# Patient Record
Sex: MALE | Race: WHITE | NOT HISPANIC OR LATINO | URBAN - METROPOLITAN AREA
[De-identification: names, ages, dates, MRNs, and addresses within clinical notes are randomized per-mention and may not be internally consistent; named-entity substitution may affect disease eponyms.]

---

## 2020-02-05 ENCOUNTER — OUTPATIENT (OUTPATIENT)
Dept: OUTPATIENT SERVICES | Facility: HOSPITAL | Age: 60
LOS: 1 days | End: 2020-02-05
Payer: MEDICAID

## 2020-02-05 DIAGNOSIS — I10 ESSENTIAL (PRIMARY) HYPERTENSION: ICD-10-CM

## 2020-02-05 DIAGNOSIS — Z95.1 PRESENCE OF AORTOCORONARY BYPASS GRAFT: ICD-10-CM

## 2020-02-05 PROCEDURE — 93306 TTE W/DOPPLER COMPLETE: CPT

## 2020-02-05 PROCEDURE — 93306 TTE W/DOPPLER COMPLETE: CPT | Mod: 26

## 2024-04-17 ENCOUNTER — OUTPATIENT (OUTPATIENT)
Dept: OUTPATIENT SERVICES | Facility: HOSPITAL | Age: 64
LOS: 1 days | End: 2024-04-17
Payer: COMMERCIAL

## 2024-04-17 VITALS
HEART RATE: 53 BPM | RESPIRATION RATE: 20 BRPM | OXYGEN SATURATION: 100 % | SYSTOLIC BLOOD PRESSURE: 117 MMHG | DIASTOLIC BLOOD PRESSURE: 74 MMHG | TEMPERATURE: 98 F

## 2024-04-17 VITALS
DIASTOLIC BLOOD PRESSURE: 75 MMHG | OXYGEN SATURATION: 96 % | HEART RATE: 55 BPM | SYSTOLIC BLOOD PRESSURE: 115 MMHG | RESPIRATION RATE: 16 BRPM

## 2024-04-17 DIAGNOSIS — Z95.1 PRESENCE OF AORTOCORONARY BYPASS GRAFT: Chronic | ICD-10-CM

## 2024-04-17 DIAGNOSIS — Z89.512 ACQUIRED ABSENCE OF LEFT LEG BELOW KNEE: Chronic | ICD-10-CM

## 2024-04-17 DIAGNOSIS — I34.0 NONRHEUMATIC MITRAL (VALVE) INSUFFICIENCY: ICD-10-CM

## 2024-04-17 LAB
ANION GAP SERPL CALC-SCNC: 12 MMOL/L — SIGNIFICANT CHANGE UP (ref 5–17)
BUN SERPL-MCNC: 7.2 MG/DL — LOW (ref 8–20)
CALCIUM SERPL-MCNC: 8.7 MG/DL — SIGNIFICANT CHANGE UP (ref 8.4–10.5)
CHLORIDE SERPL-SCNC: 95 MMOL/L — LOW (ref 96–108)
CO2 SERPL-SCNC: 24 MMOL/L — SIGNIFICANT CHANGE UP (ref 22–29)
CREAT SERPL-MCNC: 0.54 MG/DL — SIGNIFICANT CHANGE UP (ref 0.5–1.3)
EGFR: 111 ML/MIN/1.73M2 — SIGNIFICANT CHANGE UP
GLUCOSE SERPL-MCNC: 125 MG/DL — HIGH (ref 70–99)
HCT VFR BLD CALC: 36.3 % — LOW (ref 39–50)
HGB BLD-MCNC: 13.3 G/DL — SIGNIFICANT CHANGE UP (ref 13–17)
MAGNESIUM SERPL-MCNC: 2 MG/DL — SIGNIFICANT CHANGE UP (ref 1.6–2.6)
MCHC RBC-ENTMCNC: 36.3 PG — HIGH (ref 27–34)
MCHC RBC-ENTMCNC: 36.6 GM/DL — HIGH (ref 32–36)
MCV RBC AUTO: 99.2 FL — SIGNIFICANT CHANGE UP (ref 80–100)
PLATELET # BLD AUTO: 246 K/UL — SIGNIFICANT CHANGE UP (ref 150–400)
POTASSIUM SERPL-MCNC: 4.6 MMOL/L — SIGNIFICANT CHANGE UP (ref 3.5–5.3)
POTASSIUM SERPL-SCNC: 4.6 MMOL/L — SIGNIFICANT CHANGE UP (ref 3.5–5.3)
RBC # BLD: 3.66 M/UL — LOW (ref 4.2–5.8)
RBC # FLD: 11.8 % — SIGNIFICANT CHANGE UP (ref 10.3–14.5)
SODIUM SERPL-SCNC: 131 MMOL/L — LOW (ref 135–145)
WBC # BLD: 10.01 K/UL — SIGNIFICANT CHANGE UP (ref 3.8–10.5)
WBC # FLD AUTO: 10.01 K/UL — SIGNIFICANT CHANGE UP (ref 3.8–10.5)

## 2024-04-17 PROCEDURE — 93005 ELECTROCARDIOGRAM TRACING: CPT

## 2024-04-17 PROCEDURE — 80048 BASIC METABOLIC PNL TOTAL CA: CPT

## 2024-04-17 PROCEDURE — 93312 ECHO TRANSESOPHAGEAL: CPT

## 2024-04-17 PROCEDURE — 93010 ELECTROCARDIOGRAM REPORT: CPT

## 2024-04-17 PROCEDURE — 85027 COMPLETE CBC AUTOMATED: CPT

## 2024-04-17 PROCEDURE — 93325 DOPPLER ECHO COLOR FLOW MAPG: CPT | Mod: 26

## 2024-04-17 PROCEDURE — 36415 COLL VENOUS BLD VENIPUNCTURE: CPT

## 2024-04-17 PROCEDURE — 93325 DOPPLER ECHO COLOR FLOW MAPG: CPT

## 2024-04-17 PROCEDURE — 93320 DOPPLER ECHO COMPLETE: CPT

## 2024-04-17 PROCEDURE — 93320 DOPPLER ECHO COMPLETE: CPT | Mod: 26

## 2024-04-17 PROCEDURE — 83735 ASSAY OF MAGNESIUM: CPT

## 2024-04-17 PROCEDURE — 93312 ECHO TRANSESOPHAGEAL: CPT | Mod: 26

## 2024-04-17 RX ORDER — ATORVASTATIN CALCIUM 80 MG/1
1 TABLET, FILM COATED ORAL
Refills: 0 | DISCHARGE

## 2024-04-17 RX ORDER — METOPROLOL TARTRATE 50 MG
1 TABLET ORAL
Refills: 0 | DISCHARGE

## 2024-04-17 RX ORDER — ASPIRIN/CALCIUM CARB/MAGNESIUM 324 MG
1 TABLET ORAL
Refills: 0 | DISCHARGE

## 2024-04-17 RX ORDER — LISINOPRIL 2.5 MG/1
1 TABLET ORAL
Refills: 0 | DISCHARGE
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
## 2024-04-17 NOTE — H&P PST ADULT - NSICDXPASTMEDICALHX_GEN_ALL_CORE_FT
PAST MEDICAL HISTORY:  Coronary artery disease involving native coronary artery of native heart with unstable angina pector     Hyperlipidemia, unspecified hyperlipidemia type     Nonrheumatic mitral valve regurgitation     Prediabetes     Primary hypertension     Seizures

## 2024-04-17 NOTE — DISCHARGE NOTE PROVIDER - NSDCMRMEDTOKEN_GEN_ALL_CORE_FT
Aspir 81 oral delayed release tablet: 1 tab(s) orally once a day  atorvastatin 40 mg oral tablet: 1 tab(s) orally once a day (at bedtime)  lisinopril 10 mg oral tablet: 1 tab(s) orally once a day  metoprolol tartrate 25 mg oral tablet: 1 tab(s) orally 2 times a day  Multiple Vitamins oral tablet: 1 tab(s) orally once a day  phenytoin 100 mg oral capsule: orally once a day

## 2024-04-17 NOTE — DISCHARGE NOTE PROVIDER - CARE PROVIDER_API CALL
[place] [address]  Phone: [phone]  Fax: [phone]  Established Patient  Follow Up Time: 1 month

## 2024-04-17 NOTE — DISCHARGE NOTE NURSING/CASE MANAGEMENT/SOCIAL WORK - NSDCPEFALRISK_GEN_ALL_CORE
For information on Fall & Injury Prevention, visit: [email or web address] OR  [email or web address] OR  https://www.cdc.gov/steadi/patient.html

## 2024-04-17 NOTE — DISCHARGE NOTE PROVIDER - PROVIDER TOKENS
FREE:[LAST:[Garber],FIRST:[Misty],PHONE:[phone]],FAX:[phone]],ADDRESS:[[place] [address]],FOLLOWUP:[1 month],ESTABLISHEDPATIENT:[T]]

## 2024-04-17 NOTE — DISCHARGE NOTE PROVIDER - NSDCCPCAREPLAN_GEN_ALL_CORE_FT
PRINCIPAL DISCHARGE DIAGNOSIS  Diagnosis: Nonrheumatic mitral valve regurgitation  Assessment and Plan of Treatment:   Continue current medications.  Follow up with Dr. Garber.

## 2024-04-17 NOTE — DISCHARGE NOTE NURSING/CASE MANAGEMENT/SOCIAL WORK - PATIENT PORTAL LINK FT
You can access the FollowMyHealth Patient Portal offered by [facility] by registering at the following website: [email or web address]. By joining [facility]’s FollowMyHealth portal, you will also be able to view your health information using other applications (apps) compatible with our system.
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
## 2024-04-17 NOTE — H&P PST ADULT - OTHER CARE PROVIDERS
Called patient with her results and left her a detailed message and for her to give me a call back ...   Misty Garber ([facility], [address] [place] [id number], Phone: [phone], Fax: [phone])

## 2024-04-17 NOTE — PROGRESS NOTE ADULT - SUBJECTIVE AND OBJECTIVE BOX
Department of Cardiology                                                                  [facility]/[place] [address]                                                            Telephone: [phone]. Fax:[phone]                                                                                         CLIVE NOTE     63y Male S/P CLIVE which showed:   ·	Left Ventricle: Left ventricular wall thickness is normal. Left ventricular systolic function is normal with an ejection fraction visually estimated at 55 to 60%. There is no evidence of a left ventricular thrombus.  ·	Right Ventricle: The right ventricular cavity is normal in size and normal systolic function.  ·	Left Atrium: The left atrium is normal in size. There is no evidence of left atrial or left atrial appendage thrombus. The left atrial appendage emptying velocity is normal. The pulmonary venous systolic velocity is blunted consistent with elevated left atrial pressure.  ·	Right Atrium: The right atrium is normal in size. Right atrial appendage is not well visualized.  ·	Interatrial Septum: There is no evidence of a patent foramen ovale by color flow Doppler.  ·	Aortic Valve: The aortic valve appears trileaflet with normal systolic excursion. There is no aortic valve stenosis. There is mild aortic regurgitation.  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 There is prolapse of the middle scallop (P2) of the posterior leaflet. There is no mitral valve stenosis. There is moderate to severe mitral regurgitation. There is blunting of the pulmonary venous flow consistent with elevated left atrial pressure.  ·	Tricuspid Valve: There is no evidence of tricuspid stenosis. There is no evidence of tricuspid regurgitation.  ·	Pulmonic Valve: There is no pulmonic valve stenosis. There is no evidence of pulmonic regurgitation.  ·	Pericardium: No pericardial effusion seen.    PAST MEDICAL & SURGICAL HISTORY:  Coronary artery disease involving native coronary artery of native heart with unstable angina pector  Primary hypertension  Hyperlipidemia, unspecified hyperlipidemia type  Prediabetes  Nonrheumatic mitral valve regurgitation  Seizures  S/P CABG x 3  Status post below-knee amputation of left lower extremity    Home Medications:  Aspir 81 oral delayed release tablet: 1 tab(s) orally once a day (17 Apr 2024 07:56)  atorvastatin 40 mg oral tablet: 1 tab(s) orally once a day (at bedtime) (17 Apr 2024 07:56)  lisinopril 10 mg oral tablet: 1 tab(s) orally once a day (17 Apr 2024 07:57)  metoprolol tartrate 25 mg oral tablet: 1 tab(s) orally 2 times a day (17 Apr 2024 07:58)  Multiple Vitamins oral tablet: 1 tab(s) orally once a day (17 Apr 2024 07:57)  phenytoin 100 mg oral capsule: orally once a day (17 Apr 2024 07:58)    Objective:   T(C): 36.6 (04-17-24 @ 08:04), Max: 36.6 (04-17-24 @ 08:04)  HR: 55 (04-17-24 @ 09:45) (53 - 55)  BP: 115/75 (04-17-24 @ 09:45) (107/75 - 117/74)  RR: 16 (04-17-24 @ 09:45) (16 - 20)  SpO2: 96% (04-17-24 @ 09:45) (96% - 100%)    CM: SR  General: Awake, alert, speech clear, no acute distress  Chest: S1, S2, RRR, CTA B/L  Neuro: A&OX3, CN II-XII grossly intact                          13.3   10.01 )-----------( 246      ( 17 Apr 2024 07:45 )             36.3     04-17    131  |  95    |  7.2  ----------------------------<  125  4.6   |  24.0  |  0.54    Ca    8.7      17 Apr 2024 07:45  Mg     2.0     04-17      Assessment/Plan:     Problem List:   1. S/P CLIVE: Moderate to severe MR  Vital signs:        Every 15 minutes times 4 sets,       then every 30 minutes until Eric score returns to baseline,        then as per unit routine.  Bedrest for 1 hour.    Discharge Planning:   ·	Follow up with: Dr. Garber  ·	Discharge today.

## 2024-04-17 NOTE — H&P PST ADULT - PRIMARY CARE PROVIDER
Santiago Chanel ([facility], [address] [place] [id number], Phone: [phone], Fax: [phone])

## 2024-04-17 NOTE — H&P PST ADULT - NSICDXPASTSURGICALHX_GEN_ALL_CORE_FT
PAST SURGICAL HISTORY:  S/P CABG x 3     Status post below-knee amputation of left lower extremity
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
## 2024-04-17 NOTE — H&P PST ADULT - NSICDXFAMILYHX_GEN_ALL_CORE_FT
FAMILY HISTORY:  Father  Still living? No  Family history of malignant neoplasm of urinary bladder, Age at diagnosis: Age Unknown    Mother  Still living? No  Family history of asthma, Age at diagnosis: Age Unknown

## 2024-04-17 NOTE — DISCHARGE NOTE PROVIDER - NSDCCPTREATMENT_GEN_ALL_CORE_FT
PRINCIPAL PROCEDURE  Procedure: Transesophageal echocardiogram  Findings and Treatment:   Left Ventricle: Left ventricular wall thickness is normal. Left ventricular systolic function is normal with an ejection fraction visually estimated at 55 to 60%. There is no evidence of a left ventricular thrombus.  Right Ventricle: The right ventricular cavity is normal in size and normal systolic function.  Left Atrium: The left atrium is normal in size. There is no evidence of left atrial or left atrial appendage thrombus. The left atrial appendage emptying velocity is normal. The pulmonary venous systolic velocity is blunted consistent with elevated left atrial pressure.  Right Atrium: The right atrium is normal in size. Right atrial appendage is not well visualized.  Interatrial Septum: There is no evidence of a patent foramen ovale by color flow Doppler.  Aortic Valve: The aortic valve appears trileaflet with normal systolic excursion. There is no aortic valve stenosis. There is mild aortic regurgitation.  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 There is prolapse of the middle scallop (P2) of the posterior leaflet. There is no mitral valve stenosis. There is moderate to severe mitral regurgitation. There is blunting of the pulmonary venous flow consistent with elevated left atrial pressure.  Tricuspid Valve: There is no evidence of tricuspid stenosis. There is no evidence of tricuspid regurgitation.  Pulmonic Valve: There is no pulmonic valve stenosis. There is no evidence of pulmonic regurgitation.  Pericardium: No pericardial effusion seen.